# Patient Record
Sex: FEMALE
[De-identification: names, ages, dates, MRNs, and addresses within clinical notes are randomized per-mention and may not be internally consistent; named-entity substitution may affect disease eponyms.]

---

## 2019-12-17 ENCOUNTER — HOSPITAL ENCOUNTER (EMERGENCY)
Dept: HOSPITAL 44 - ED | Age: 19
Discharge: HOME | End: 2019-12-17
Payer: COMMERCIAL

## 2019-12-17 VITALS — SYSTOLIC BLOOD PRESSURE: 132 MMHG | DIASTOLIC BLOOD PRESSURE: 93 MMHG

## 2019-12-17 DIAGNOSIS — H66.91: Primary | ICD-10-CM

## 2019-12-17 DIAGNOSIS — F17.210: ICD-10-CM

## 2019-12-17 PROCEDURE — 99281 EMR DPT VST MAYX REQ PHY/QHP: CPT

## 2019-12-17 PROCEDURE — 99284 EMERGENCY DEPT VISIT MOD MDM: CPT

## 2019-12-17 NOTE — ED PHYSICIAN DOCUMENTATION
General Adult





- HISTORIAN


Historian: patient, parent





- HPI


Stated Complaint: R ear pain


Chief Complaint: General Adult


Onset: days ago


Timing: still present


Severity: moderate


Further Comments: yes (Pt is a 20 yo female with R ear pain x 3 days.  Pt has 

had a mild sore throat and swollen lymph nodes.)





- ROS


CONST: other (malaise)


EYES/ENT: sore throat, other (R ear pain)


CVS/RESP: none


GI/: none


MS/SKIN/LYMPH: none





- PAST HX


Past History: other (depression)


Allergies/Adverse Reactions: 


                                    Allergies











Allergy/AdvReac Type Severity Reaction Status Date / Time


 


No Known Allergies Allergy   Verified 12/17/19 09:08














Home Medications: 


                                Ambulatory Orders











 Medication  Instructions  Recorded


 


Amoxicillin [Trimox] 500 mg PO TID #30 capsule 12/17/19


 


Baclofen 10 mg PO TID PRN 12/17/19


 


Doxepin HCl [Sinaquan] 10 mg PO HS 12/17/19


 


Venlafaxine HCl 75 mg PO DAILY 12/17/19














- SOCIAL HX


Smoking History: cigarettes





- FAMILY HX


Family History: No





- REVIEWED ASSESSMENTS


Nursing Assessment  Reviewed: Yes


Vitals Reviewed: Yes





Progress





- Progress


Progress: 





Rx Amoxicillin 500 mg.  Take one by mouth every 8 hours for 10 days.





General Adult Physical Exam





- PHYSICAL EXAM


GENERAL APPEARANCE: mild distress


EENT: TM erythema (R)


NECK: normal inspection, supple, lymphadenopathy


RESPIRATORY: no resp distress, chest non-tender, breath sounds normal


CVS: reg rate & rhythm, heart sounds normal


ABDOMEN: soft, no organomegaly, normal bowel sounds


BACK: normal inspection


SKIN: warm/dry, normal color


EXTREMITIES: non-tender, normal range of motion, no evidence of injury


NEURO: oriented X3, motor nml, sensation nml





Discharge


Clincal Impression: 


Otitis media


Qualifiers:


 Otitis media type: unspecified Chronicity: acute Qualified Code(s): H66.90 - 

Otitis media, unspecified, unspecified ear





Prescriptions: 


Amoxicillin [Trimox] 500 mg PO TID #30 capsule


Referrals: 


Primary Doctor,No [Primary Care Provider] - 


Condition: Stable


Disposition: 01 HOME, SELF-CARE


Decision to Admit: NO


Decision Time: 09:11